# Patient Record
Sex: FEMALE | Race: WHITE | NOT HISPANIC OR LATINO | Employment: FULL TIME | URBAN - METROPOLITAN AREA
[De-identification: names, ages, dates, MRNs, and addresses within clinical notes are randomized per-mention and may not be internally consistent; named-entity substitution may affect disease eponyms.]

---

## 2022-05-18 ENCOUNTER — LAB REQUISITION (OUTPATIENT)
Dept: LAB | Facility: CLINIC | Age: 22
End: 2022-05-18

## 2022-05-18 PROCEDURE — 86481 TB AG RESPONSE T-CELL SUSP: CPT | Performed by: UROLOGY

## 2022-05-20 LAB
GAMMA INTERFERON BACKGROUND BLD IA-ACNC: 0 IU/ML
M TB IFN-G BLD-IMP: NEGATIVE
M TB IFN-G CD4+ BCKGRND COR BLD-ACNC: 10 IU/ML
MITOGEN IGNF BCKGRD COR BLD-ACNC: 0 IU/ML
MITOGEN IGNF BCKGRD COR BLD-ACNC: 0 IU/ML
QUANTIFERON MITOGEN: 10 IU/ML
QUANTIFERON NIL TUBE: 0 IU/ML
QUANTIFERON TB1 TUBE: 0 IU/ML
QUANTIFERON TB2 TUBE: 0

## 2023-07-21 ENCOUNTER — OFFICE VISIT (OUTPATIENT)
Dept: OBGYN | Facility: CLINIC | Age: 23
End: 2023-07-21
Attending: ADVANCED PRACTICE MIDWIFE
Payer: COMMERCIAL

## 2023-07-21 VITALS
WEIGHT: 121.6 LBS | HEART RATE: 90 BPM | BODY MASS INDEX: 22.38 KG/M2 | HEIGHT: 62 IN | DIASTOLIC BLOOD PRESSURE: 73 MMHG | SYSTOLIC BLOOD PRESSURE: 116 MMHG

## 2023-07-21 DIAGNOSIS — Z30.09 GENERAL COUNSELING FOR PRESCRIPTION OF ORAL CONTRACEPTIVES: Primary | ICD-10-CM

## 2023-07-21 DIAGNOSIS — Z30.41 ORAL CONTRACEPTIVE PILL SURVEILLANCE: ICD-10-CM

## 2023-07-21 PROCEDURE — 99213 OFFICE O/P EST LOW 20 MIN: CPT | Performed by: ADVANCED PRACTICE MIDWIFE

## 2023-07-21 PROCEDURE — G0463 HOSPITAL OUTPT CLINIC VISIT: HCPCS | Performed by: ADVANCED PRACTICE MIDWIFE

## 2023-07-21 RX ORDER — NORGESTIMATE AND ETHINYL ESTRADIOL 0.25-0.035
1 KIT ORAL
Qty: 84 TABLET | Refills: 4 | Status: SHIPPED | OUTPATIENT
Start: 2023-07-21 | End: 2024-09-07

## 2023-07-21 RX ORDER — NORGESTIMATE AND ETHINYL ESTRADIOL 0.25-0.035
1 KIT ORAL
COMMUNITY
Start: 2023-07-02 | End: 2023-07-21

## 2023-07-21 ASSESSMENT — ENCOUNTER SYMPTOMS
NAUSEA: 0
FREQUENCY: 0
WEAKNESS: 0
CONSTIPATION: 0
SORE THROAT: 0
HEMATOCHEZIA: 0
NERVOUS/ANXIOUS: 0
CHILLS: 0
HEADACHES: 0
HEMATURIA: 0
ABDOMINAL PAIN: 0
DYSURIA: 0
BREAST MASS: 0
DIZZINESS: 0
PARESTHESIAS: 0
MYALGIAS: 0
SHORTNESS OF BREATH: 0
HEARTBURN: 0
PALPITATIONS: 0
DIARRHEA: 0
ARTHRALGIAS: 0
EYE PAIN: 0
JOINT SWELLING: 0
COUGH: 0
FEVER: 0

## 2023-07-21 ASSESSMENT — ANXIETY QUESTIONNAIRES
3. WORRYING TOO MUCH ABOUT DIFFERENT THINGS: NOT AT ALL
7. FEELING AFRAID AS IF SOMETHING AWFUL MIGHT HAPPEN: NOT AT ALL
GAD7 TOTAL SCORE: 0
2. NOT BEING ABLE TO STOP OR CONTROL WORRYING: NOT AT ALL
IF YOU CHECKED OFF ANY PROBLEMS ON THIS QUESTIONNAIRE, HOW DIFFICULT HAVE THESE PROBLEMS MADE IT FOR YOU TO DO YOUR WORK, TAKE CARE OF THINGS AT HOME, OR GET ALONG WITH OTHER PEOPLE: NOT DIFFICULT AT ALL
5. BEING SO RESTLESS THAT IT IS HARD TO SIT STILL: NOT AT ALL
3. WORRYING TOO MUCH ABOUT DIFFERENT THINGS: NOT AT ALL
GAD7 TOTAL SCORE: 0
2. NOT BEING ABLE TO STOP OR CONTROL WORRYING: NOT AT ALL
1. FEELING NERVOUS, ANXIOUS, OR ON EDGE: NOT AT ALL
6. BECOMING EASILY ANNOYED OR IRRITABLE: NOT AT ALL
7. FEELING AFRAID AS IF SOMETHING AWFUL MIGHT HAPPEN: NOT AT ALL
GAD7 TOTAL SCORE: 0
1. FEELING NERVOUS, ANXIOUS, OR ON EDGE: NOT AT ALL
5. BEING SO RESTLESS THAT IT IS HARD TO SIT STILL: NOT AT ALL
6. BECOMING EASILY ANNOYED OR IRRITABLE: NOT AT ALL
4. TROUBLE RELAXING: NOT AT ALL
IF YOU CHECKED OFF ANY PROBLEMS ON THIS QUESTIONNAIRE, HOW DIFFICULT HAVE THESE PROBLEMS MADE IT FOR YOU TO DO YOUR WORK, TAKE CARE OF THINGS AT HOME, OR GET ALONG WITH OTHER PEOPLE: NOT DIFFICULT AT ALL

## 2023-07-21 ASSESSMENT — PATIENT HEALTH QUESTIONNAIRE - PHQ9
5. POOR APPETITE OR OVEREATING: NOT AT ALL
SUM OF ALL RESPONSES TO PHQ QUESTIONS 1-9: 0

## 2023-07-21 NOTE — PATIENT INSTRUCTIONS
Remember to take your pill daily at the same time. You may experience irregular spotting/bleeding, breast tenderness, nausea, or other PMS-like symptoms when you first start the pill. If you miss a pill, please take it as soon as you remember. If you remember the next day, take 2 pills. The package insert of the pills can give you detailed information on what to do if you miss a pill so I recommend keeping one handy. Anytime you miss a pill, it's possible you could become pregnant if you have unprotected intercourse with a male partner. I recommend using condoms for 7 days after missing a pill to ensure pregnancy prevention.    After starting your pill, please recheck your blood pressure in 4-6 weeks. You can call to schedule an appointment at the clinic to do this or go to a pharmacy and use the machine in the pharmacy. We would like your blood pressure to be below 130/80. If it is higher than this number, please schedule an appointment so we can discuss whether it is a good idea for you to stay on the birth control pill.    The birth control pill increases your risk of blood clots. If you have any of the following symptoms, please make sure you go to the emergency room:  A: Abdominal pain that is severe and does not go away  C: Chest Pain or shortness or breath  H: Headache that comes on suddenly and doesn't get better with medication or caffeine  E: Eye/Vision issues  S: Swelling or redness in the extremities, particularly the legs or areas behind the knee

## 2023-07-21 NOTE — LETTER
2023       RE: Lorenza Rapp  [address]  [place] [address]     Dear Colleague,    Thank you for referring your patient, Lorenza Rapp, to the [facility] [place] at [facility]. Please see a copy of my visit note below.    S: Lorenza is a  here today for birth control refill. She is on Sprintek and has been on the medication since age 15. She is very happy with the OCP and wishes to continue.     Periods are monthly and last 3-4 days, no heavy bleeding or pain.    Had STI screening since last sexual partner. Not currently sexually active. Declines STI screening today.    Has not had a pap smear and is planning on scheduling a visit in the near future to have exam. She declines pap smear today.     Medical, Surgical and Family History reviewed. There are no contraindications to continuing OCPs.    O: /73   Pulse 90   Wt 55.2 kg (121 lb 9.6 oz)   LMP 2023 (Approximate)   Physical exam deferred. Will schedule complete physical in the future.    A/P:  (Z30.09) General counseling for prescription of oral contraceptives  (primary encounter diagnosis)  (Z30.41) Oral contraceptive pill surveillance  Plan: SPRINTEC 28 0.25-35 MG-MCG tablet  Reviewed ACHES and what to do in case of missed pills. Information posted to AVS.    Kinza Nuñez CNM

## 2023-07-21 NOTE — PROGRESS NOTES
S: Lorenza is a  here today for birth control refill. She is on Sprintek and has been on the medication since age 15. She is very happy with the OCP and wishes to continue.     Periods are monthly and last 3-4 days, no heavy bleeding or pain.    Had STI screening since last sexual partner. Not currently sexually active. Declines STI screening today.    Has not had a pap smear and is planning on scheduling a visit in the near future to have exam. She declines pap smear today.     Medical, Surgical and Family History reviewed. There are no contraindications to continuing OCPs.    O: /73   Pulse 90   Wt 55.2 kg (121 lb 9.6 oz)   LMP 2023 (Approximate)   Physical exam deferred. Will schedule complete physical in the future.    A/P:  (Z30.09) General counseling for prescription of oral contraceptives  (primary encounter diagnosis)  (Z30.41) Oral contraceptive pill surveillance  Plan: SPRINTEC 28 0.25-35 MG-MCG tablet  Reviewed ACHES and what to do in case of missed pills. Information posted to AVS.

## 2023-08-13 ENCOUNTER — HEALTH MAINTENANCE LETTER (OUTPATIENT)
Age: 23
End: 2023-08-13

## 2024-02-25 ENCOUNTER — HOSPITAL ENCOUNTER (EMERGENCY)
Facility: CLINIC | Age: 24
Discharge: HOME | End: 2024-02-25
Attending: STUDENT IN AN ORGANIZED HEALTH CARE EDUCATION/TRAINING PROGRAM | Admitting: STUDENT IN AN ORGANIZED HEALTH CARE EDUCATION/TRAINING PROGRAM

## 2024-02-25 VITALS
TEMPERATURE: 99 F | WEIGHT: 115 LBS | OXYGEN SATURATION: 99 % | RESPIRATION RATE: 20 BRPM | DIASTOLIC BLOOD PRESSURE: 66 MMHG | HEIGHT: 62 IN | SYSTOLIC BLOOD PRESSURE: 105 MMHG | BODY MASS INDEX: 21.16 KG/M2 | HEART RATE: 88 BPM

## 2024-02-25 DIAGNOSIS — R74.01 TRANSAMINITIS: ICD-10-CM

## 2024-02-25 DIAGNOSIS — J06.9 VIRAL UPPER RESPIRATORY INFECTION: ICD-10-CM

## 2024-02-25 DIAGNOSIS — R21 RASH: ICD-10-CM

## 2024-02-25 DIAGNOSIS — J10.1 INFLUENZA A: ICD-10-CM

## 2024-02-25 LAB
ALBUMIN SERPL BCG-MCNC: 3.7 G/DL (ref 3.5–5.2)
ALP SERPL-CCNC: 67 U/L (ref 40–150)
ALT SERPL W P-5'-P-CCNC: 58 U/L (ref 0–50)
ANION GAP SERPL CALCULATED.3IONS-SCNC: 10 MMOL/L (ref 7–15)
AST SERPL W P-5'-P-CCNC: 58 U/L (ref 0–45)
BASOPHILS # BLD AUTO: 0 10E3/UL (ref 0–0.2)
BASOPHILS NFR BLD AUTO: 0 %
BILIRUB SERPL-MCNC: <0.2 MG/DL
BUN SERPL-MCNC: 4.5 MG/DL (ref 6–20)
CALCIUM SERPL-MCNC: 8 MG/DL (ref 8.6–10)
CHLORIDE SERPL-SCNC: 105 MMOL/L (ref 98–107)
CREAT SERPL-MCNC: 0.66 MG/DL (ref 0.51–0.95)
DEPRECATED HCO3 PLAS-SCNC: 23 MMOL/L (ref 22–29)
EGFRCR SERPLBLD CKD-EPI 2021: >90 ML/MIN/1.73M2
EOSINOPHIL # BLD AUTO: 0 10E3/UL (ref 0–0.7)
EOSINOPHIL NFR BLD AUTO: 0 %
ERYTHROCYTE [DISTWIDTH] IN BLOOD BY AUTOMATED COUNT: 12.4 % (ref 10–15)
FLUAV RNA SPEC QL NAA+PROBE: POSITIVE
FLUBV RNA RESP QL NAA+PROBE: NEGATIVE
GLUCOSE SERPL-MCNC: 124 MG/DL (ref 70–99)
GROUP A STREP BY PCR: NOT DETECTED
HCG SERPL QL: NEGATIVE
HCT VFR BLD AUTO: 37.2 % (ref 35–47)
HGB BLD-MCNC: 12.6 G/DL (ref 11.7–15.7)
IMM GRANULOCYTES # BLD: 0 10E3/UL
IMM GRANULOCYTES NFR BLD: 0 %
LIPASE SERPL-CCNC: 24 U/L (ref 13–60)
LYMPHOCYTES # BLD AUTO: 1 10E3/UL (ref 0.8–5.3)
LYMPHOCYTES NFR BLD AUTO: 17 %
MCH RBC QN AUTO: 29.2 PG (ref 26.5–33)
MCHC RBC AUTO-ENTMCNC: 33.9 G/DL (ref 31.5–36.5)
MCV RBC AUTO: 86 FL (ref 78–100)
MONOCYTES # BLD AUTO: 0.5 10E3/UL (ref 0–1.3)
MONOCYTES NFR BLD AUTO: 8 %
MONOCYTES NFR BLD AUTO: NEGATIVE %
NEUTROPHILS # BLD AUTO: 4.5 10E3/UL (ref 1.6–8.3)
NEUTROPHILS NFR BLD AUTO: 75 %
NRBC # BLD AUTO: 0 10E3/UL
NRBC BLD AUTO-RTO: 0 /100
PLATELET # BLD AUTO: 191 10E3/UL (ref 150–450)
POTASSIUM SERPL-SCNC: 3.8 MMOL/L (ref 3.4–5.3)
PROT SERPL-MCNC: 6.6 G/DL (ref 6.4–8.3)
RBC # BLD AUTO: 4.32 10E6/UL (ref 3.8–5.2)
RSV RNA SPEC NAA+PROBE: NEGATIVE
SARS-COV-2 RNA RESP QL NAA+PROBE: NEGATIVE
SODIUM SERPL-SCNC: 138 MMOL/L (ref 135–145)
WBC # BLD AUTO: 6 10E3/UL (ref 4–11)

## 2024-02-25 PROCEDURE — 87637 SARSCOV2&INF A&B&RSV AMP PRB: CPT | Performed by: EMERGENCY MEDICINE

## 2024-02-25 PROCEDURE — 96375 TX/PRO/DX INJ NEW DRUG ADDON: CPT

## 2024-02-25 PROCEDURE — 83690 ASSAY OF LIPASE: CPT | Performed by: STUDENT IN AN ORGANIZED HEALTH CARE EDUCATION/TRAINING PROGRAM

## 2024-02-25 PROCEDURE — 87651 STREP A DNA AMP PROBE: CPT | Performed by: EMERGENCY MEDICINE

## 2024-02-25 PROCEDURE — 99284 EMERGENCY DEPT VISIT MOD MDM: CPT | Mod: 25

## 2024-02-25 PROCEDURE — 36415 COLL VENOUS BLD VENIPUNCTURE: CPT | Performed by: STUDENT IN AN ORGANIZED HEALTH CARE EDUCATION/TRAINING PROGRAM

## 2024-02-25 PROCEDURE — 250N000013 HC RX MED GY IP 250 OP 250 PS 637: Performed by: STUDENT IN AN ORGANIZED HEALTH CARE EDUCATION/TRAINING PROGRAM

## 2024-02-25 PROCEDURE — 84703 CHORIONIC GONADOTROPIN ASSAY: CPT | Performed by: STUDENT IN AN ORGANIZED HEALTH CARE EDUCATION/TRAINING PROGRAM

## 2024-02-25 PROCEDURE — 250N000011 HC RX IP 250 OP 636: Mod: JZ | Performed by: STUDENT IN AN ORGANIZED HEALTH CARE EDUCATION/TRAINING PROGRAM

## 2024-02-25 PROCEDURE — 258N000003 HC RX IP 258 OP 636: Performed by: STUDENT IN AN ORGANIZED HEALTH CARE EDUCATION/TRAINING PROGRAM

## 2024-02-25 PROCEDURE — 87651 STREP A DNA AMP PROBE: CPT | Performed by: STUDENT IN AN ORGANIZED HEALTH CARE EDUCATION/TRAINING PROGRAM

## 2024-02-25 PROCEDURE — 96374 THER/PROPH/DIAG INJ IV PUSH: CPT

## 2024-02-25 PROCEDURE — 86308 HETEROPHILE ANTIBODY SCREEN: CPT | Performed by: STUDENT IN AN ORGANIZED HEALTH CARE EDUCATION/TRAINING PROGRAM

## 2024-02-25 PROCEDURE — 96361 HYDRATE IV INFUSION ADD-ON: CPT

## 2024-02-25 PROCEDURE — 80053 COMPREHEN METABOLIC PANEL: CPT | Performed by: STUDENT IN AN ORGANIZED HEALTH CARE EDUCATION/TRAINING PROGRAM

## 2024-02-25 PROCEDURE — 85025 COMPLETE CBC W/AUTO DIFF WBC: CPT | Performed by: STUDENT IN AN ORGANIZED HEALTH CARE EDUCATION/TRAINING PROGRAM

## 2024-02-25 RX ORDER — ONDANSETRON 4 MG/1
4 TABLET, ORALLY DISINTEGRATING ORAL EVERY 6 HOURS PRN
Qty: 10 TABLET | Refills: 0 | Status: SHIPPED | OUTPATIENT
Start: 2024-02-25 | End: 2024-02-28

## 2024-02-25 RX ORDER — CLOTRIMAZOLE 1 G/ML
SOLUTION TOPICAL 2 TIMES DAILY
Qty: 15 ML | Refills: 0 | Status: SHIPPED | OUTPATIENT
Start: 2024-02-25 | End: 2024-04-13

## 2024-02-25 RX ORDER — METOCLOPRAMIDE HYDROCHLORIDE 5 MG/ML
10 INJECTION INTRAMUSCULAR; INTRAVENOUS ONCE
Status: COMPLETED | OUTPATIENT
Start: 2024-02-25 | End: 2024-02-25

## 2024-02-25 RX ORDER — DIPHENHYDRAMINE HYDROCHLORIDE 50 MG/ML
25 INJECTION INTRAMUSCULAR; INTRAVENOUS ONCE
Status: COMPLETED | OUTPATIENT
Start: 2024-02-25 | End: 2024-02-25

## 2024-02-25 RX ORDER — OSELTAMIVIR PHOSPHATE 75 MG/1
75 CAPSULE ORAL 2 TIMES DAILY
Qty: 10 CAPSULE | Refills: 0 | Status: SHIPPED | OUTPATIENT
Start: 2024-02-25 | End: 2024-03-01

## 2024-02-25 RX ORDER — ACETAMINOPHEN 500 MG
1000 TABLET ORAL ONCE
Status: COMPLETED | OUTPATIENT
Start: 2024-02-25 | End: 2024-02-25

## 2024-02-25 RX ADMIN — DIPHENHYDRAMINE HYDROCHLORIDE 25 MG: 50 INJECTION, SOLUTION INTRAMUSCULAR; INTRAVENOUS at 08:19

## 2024-02-25 RX ADMIN — SODIUM CHLORIDE 1000 ML: 9 INJECTION, SOLUTION INTRAVENOUS at 08:19

## 2024-02-25 RX ADMIN — ACETAMINOPHEN 1000 MG: 500 TABLET, FILM COATED ORAL at 08:13

## 2024-02-25 RX ADMIN — METOCLOPRAMIDE 10 MG: 5 INJECTION, SOLUTION INTRAMUSCULAR; INTRAVENOUS at 08:22

## 2024-02-25 ASSESSMENT — COLUMBIA-SUICIDE SEVERITY RATING SCALE - C-SSRS
1. IN THE PAST MONTH, HAVE YOU WISHED YOU WERE DEAD OR WISHED YOU COULD GO TO SLEEP AND NOT WAKE UP?: NO
6. HAVE YOU EVER DONE ANYTHING, STARTED TO DO ANYTHING, OR PREPARED TO DO ANYTHING TO END YOUR LIFE?: NO
2. HAVE YOU ACTUALLY HAD ANY THOUGHTS OF KILLING YOURSELF IN THE PAST MONTH?: NO

## 2024-02-25 ASSESSMENT — ACTIVITIES OF DAILY LIVING (ADL)
ADLS_ACUITY_SCORE: 35

## 2024-02-25 NOTE — ED PROVIDER NOTES
"  History     Chief Complaint:  Fever       The history is provided by the patient.      Lorenza Rapp is a 23 year old female who presents with fever. The patient reports that she has been sick for 24 hours. Her symptoms have been fever, headache, sore throat, and rash on hands. The rash does not itch. She denies any double or blurry vision, abdominal pain, and vomiting. Last tylenol was around midnight. No nausea or vomiting currently. She was sick last week and was vomiting then, without diarrhea.     Independent Historian:   None - Patient Only    Review of External Notes:   None      Medications:    Sprintec    Past Medical History:    Patient denies any significant past medical history    Physical Exam   Patient Vitals for the past 24 hrs:   BP Temp Temp src Pulse Resp SpO2 Height Weight   02/25/24 1036 -- -- -- -- -- 99 % -- --   02/25/24 1035 105/66 -- -- 88 -- -- -- --   02/25/24 0958 -- -- -- -- -- 97 % -- --   02/25/24 0923 100/66 -- -- 83 -- 97 % -- --   02/25/24 0552 123/55 99  F (37.2  C) Oral 115 20 97 % 1.575 m (5' 2\") 52.2 kg (115 lb)        Physical Exam  General: Awake, alert, in no acute distress   HEENT: Atraumatic   EOM normal   External ears normal   Trachea midline  Neck: Supple, normal ROM  CV: Regular rate, regular rhythm   No murmur   No lower extremity edema  2+ radial and DP pulses  PULM: Breath sounds normal bilaterally  No wheezes or rales  ABD: Soft, non-tender, non-distended  Normal bowel sounds   No rebound or guarding   MSK: No gross deformities  NEURO: Alert, no focal deficits  Skin: Scaly erythematous annular lesions on dorsum of bilateral hands          Emergency Department Course     Laboratory:  Labs Ordered and Resulted from Time of ED Arrival to Time of ED Departure   INFLUENZA A/B, RSV, & SARS-COV2 PCR - Abnormal       Result Value    Influenza A PCR Positive (*)     Influenza B PCR Negative      RSV PCR Negative      SARS CoV2 PCR Negative     COMPREHENSIVE METABOLIC " PANEL - Abnormal    Sodium 138      Potassium 3.8      Carbon Dioxide (CO2) 23      Anion Gap 10      Urea Nitrogen 4.5 (*)     Creatinine 0.66      GFR Estimate >90      Calcium 8.0 (*)     Chloride 105      Glucose 124 (*)     Alkaline Phosphatase 67      AST 58 (*)     ALT 58 (*)     Protein Total 6.6      Albumin 3.7      Bilirubin Total <0.2     MONONUCLEOSIS SCREEN - Normal    Mononucleosis Screen Negative     LIPASE - Normal    Lipase 24     HCG QUALITATIVE PREGNANCY - Normal    hCG Serum Qualitative Negative     GROUP A STREPTOCOCCUS PCR THROAT SWAB - Normal    Group A strep by PCR Not Detected     CBC WITH PLATELETS AND DIFFERENTIAL    WBC Count 6.0      RBC Count 4.32      Hemoglobin 12.6      Hematocrit 37.2      MCV 86      MCH 29.2      MCHC 33.9      RDW 12.4      Platelet Count 191      % Neutrophils 75      % Lymphocytes 17      % Monocytes 8      % Eosinophils 0      % Basophils 0      % Immature Granulocytes 0      NRBCs per 100 WBC 0      Absolute Neutrophils 4.5      Absolute Lymphocytes 1.0      Absolute Monocytes 0.5      Absolute Eosinophils 0.0      Absolute Basophils 0.0      Absolute Immature Granulocytes 0.0      Absolute NRBCs 0.0          Emergency Department Course & Assessments:    Interventions:  Medications   sodium chloride 0.9% BOLUS 1,000 mL (0 mLs Intravenous Stopped 2/25/24 0930)   metoclopramide (REGLAN) injection 10 mg (10 mg Intravenous $Given 2/25/24 0822)   diphenhydrAMINE (BENADRYL) injection 25 mg (25 mg Intravenous $Given 2/25/24 0819)   acetaminophen (TYLENOL) tablet 1,000 mg (1,000 mg Oral $Given 2/25/24 0813)        Independent Interpretation (X-rays, CTs, rhythm strip):  None    Assessments/Consultations/Discussion of Management or Tests:   ED Course as of 02/25/24 1536   Sun Feb 25, 2024   0757 I examined the patient and obtained history.   1012 I rechecked the patient. She is feeling a lot better.        Social Determinants of Health affecting care:  "  None    Disposition:  The patient was discharged.     Impression & Plan    Medical Decision Making:  Vitals tachycardic on arrival otherwise WNL.  Patient has had fever, cough, nausea and decreased p.o. intake as well as a rash on her hands.  Started at a autism care unit a few weeks ago and has been \"sick ever since\".  She is influenza A positive.  LFTs obtained which show a mild transaminitis though her abdominal exam is benign.  Suspect this is postviral.  She is symptomatically improved after headache cocktail and Tylenol.  Her rash looks most like ringworm so we will start topical antifungals.  She is in the window for Tamiflu and she wants to try it.  Lung fields are clear and white count is normal so low suspicion for superimposed bacterial pneumonia at this time.  Given PCP number so she can establish care and have follow-up.  Return precautions otherwise provided.  Patient and mother voiced understanding and agreement with this plan.  All questions answered.  Asked to have LFTs rechecked in 1 week.    Diagnosis:    ICD-10-CM    1. Viral upper respiratory infection  J06.9       2. Influenza A  J10.1       3. Transaminitis  R74.01       4. Rash  R21            Discharge Medications:  Discharge Medication List as of 2/25/2024 10:42 AM        START taking these medications    Details   clotrimazole (LOTRIMIN) 1 % external solution Apply topically 2 times daily for 48 days Use twice daily for 4 weeks or for 1 week longer than the rashes present.Disp-15 mL, [id number]      ondansetron (ZOFRAN ODT) 4 MG ODT tab Take 1 tablet (4 mg) by mouth every 6 hours as needed for vomiting or nausea, Disp-10 tablet, R-0, E-Prescribe      oseltamivir (TAMIFLU) 75 MG capsule Take 1 capsule (75 mg) by mouth 2 times daily for 5 days, Disp-10 capsule, R-0, E-Prescribe                Scribe Disclosure:  Michael FORTE, am serving as a scribe at 7:38 AM on 2/25/2024 to document services personally performed by Dom " Conchita Hebert DO based on my observations and the provider's statements to me.     2/25/2024   Conchita Hamlin DO Pappas Richter, Ellen, DO  02/25/24 1536

## 2024-02-25 NOTE — Clinical Note
Lorenza Rapp was seen and treated in our emergency department on 2/25/2024.  She may return to work on 03/02/2024.       If you have any questions or concerns, please don't hesitate to call.      Conchita Hamlin, DO

## 2024-02-25 NOTE — ED TRIAGE NOTES
"Pt arrives with mother with c/o fever for last 24 hours; \"really bad headache\"; and rash on both hands. Pain 8/10. Last ibuprofen at 0420.        "

## 2024-09-07 ENCOUNTER — MYC REFILL (OUTPATIENT)
Dept: OBGYN | Facility: CLINIC | Age: 24
End: 2024-09-07
Payer: COMMERCIAL

## 2024-09-07 DIAGNOSIS — Z30.09 GENERAL COUNSELING FOR PRESCRIPTION OF ORAL CONTRACEPTIVES: ICD-10-CM

## 2024-09-11 RX ORDER — NORGESTIMATE AND ETHINYL ESTRADIOL 0.25-0.035
1 KIT ORAL
Qty: 84 TABLET | Refills: 0 | Status: SHIPPED | OUTPATIENT
Start: 2024-09-11

## 2024-09-11 NOTE — TELEPHONE ENCOUNTER
Received request for [facility]. See RN contraceptive protocol below.    For women meeting the following criteria, hormonal contraceptives may be refilled annually for up to 3 years:  -Age between 15-40? y  -Non-smoker? y  -Up-to-date on pap/HPV? No pap on file.  -No major medical comorbidities? n  -No history of migraine with aura? n     Unable to fill per RN protocol. Routing to [facility] for review/approval.

## 2024-10-06 ENCOUNTER — HEALTH MAINTENANCE LETTER (OUTPATIENT)
Age: 24
End: 2024-10-06

## 2025-06-23 ENCOUNTER — VIRTUAL VISIT (OUTPATIENT)
Dept: PEDIATRICS | Facility: CLINIC | Age: 25
End: 2025-06-23
Payer: COMMERCIAL

## 2025-06-23 DIAGNOSIS — J02.9 SORE THROAT: Primary | ICD-10-CM

## 2025-06-23 DIAGNOSIS — L50.9 HIVES: ICD-10-CM

## 2025-06-23 PROCEDURE — 1126F AMNT PAIN NOTED NONE PRSNT: CPT | Mod: 95 | Performed by: NURSE PRACTITIONER

## 2025-06-23 PROCEDURE — 98005 SYNCH AUDIO-VIDEO EST LOW 20: CPT | Performed by: NURSE PRACTITIONER

## 2025-06-23 NOTE — PATIENT INSTRUCTIONS
Zyrtec once daily  Ok to use benadryl to help at night with sleep  Hydrocortisone on very inflamed or itchy areas

## 2025-06-23 NOTE — PROGRESS NOTES
Lorenza is a 24 year old who is being evaluated via a billable video visit.    How would you like to obtain your AVS? MyChart  If the video visit is dropped, the invitation should be resent by: Send to e-mail at: [email or web address]  Will anyone else be joining your video visit? No      Assessment & Plan     Sore throat  Recommend she also take a home COVID test. Likely viral but will have her come in for strep test.  - Streptococcus A Rapid Screen w/Reflex to PCR - Clinic Collect    Hives  Possibly viral etiology as trigger. We discussed supportive cares and OTC meds safe to use. No symptoms of angioedema or anaphylaxis but we reviewed emergent s/s.                Subjective   Lorenza is a 24 year old, presenting for the following health issues:  Derm Problem (Hives all over)      6/23/2025     8:17 AM   Additional Questions   Roomed by BB VF   Accompanied by Mom         6/23/2025     8:17 AM   Patient Reported Additional Medications   Patient reports taking the following new medications none     History of Present Illness       Reason for visit:  Hives  Symptom onset:  1-3 days ago  Symptoms include:  I have hives on my legs, arms, butt and neck. The right side of my throat hurts  Symptom intensity:  Mild  Symptom progression:  Staying the same  Had these symptoms before:  No  What makes it worse:  N/a  What makes it better:  Benadryl   She is taking medications regularly.      Was at her apartment pool yesterday, was out in the sun and developed hives on her arms, legs, neck   Has a sun burn from the pool  Felt a little queasy and felt better with eating  No hx of hives                    Objective    Vitals - Patient Reported  Temperature (Patient Reported): 98.5  F (36.9  C)  Pain Score: No Pain (0)        Physical Exam   GENERAL: alert and no distress  EYES: Eyes grossly normal to inspection.  No discharge or erythema, or obvious scleral/conjunctival abnormalities.  RESP: No audible wheeze, cough, or visible  cyanosis.  Breathing non-labored  SKIN: erythema - cheeks and urticaria to upper b/l thighs, neck, arms  ENT: no obvious lip or facial swelling  NEURO: Cranial nerves grossly intact.  Mentation and speech appropriate for age.  PSYCH: Appropriate affect, tone, and pace of words          Video-Visit Details    Type of service:  Video Visit   Originating Location (pt. Location): Home    Distant Location (provider location):  On-site  Platform used for Video Visit: Paolo  Signed Electronically by: Rita Beck NP